# Patient Record
Sex: FEMALE | URBAN - METROPOLITAN AREA
[De-identification: names, ages, dates, MRNs, and addresses within clinical notes are randomized per-mention and may not be internally consistent; named-entity substitution may affect disease eponyms.]

---

## 2024-07-03 ENCOUNTER — APPOINTMENT (OUTPATIENT)
Dept: URBAN - METROPOLITAN AREA CLINIC 254 | Age: 29
Setting detail: DERMATOLOGY
End: 2024-07-05

## 2024-07-03 VITALS — HEIGHT: 67 IN | WEIGHT: 156 LBS

## 2024-07-03 DIAGNOSIS — L91.0 HYPERTROPHIC SCAR: ICD-10-CM

## 2024-07-03 PROCEDURE — OTHER INTRALESIONAL KENALOG: OTHER

## 2024-07-03 PROCEDURE — OTHER ADDITIONAL NOTES: OTHER

## 2024-07-03 PROCEDURE — OTHER MIPS QUALITY: OTHER

## 2024-07-03 PROCEDURE — 11900 INJECT SKIN LESIONS </W 7: CPT

## 2024-07-03 PROCEDURE — OTHER COUNSELING: OTHER

## 2024-07-03 ASSESSMENT — LOCATION SIMPLE DESCRIPTION DERM: LOCATION SIMPLE: RIGHT EAR

## 2024-07-03 ASSESSMENT — LOCATION ZONE DERM: LOCATION ZONE: EAR

## 2024-07-03 ASSESSMENT — LOCATION DETAILED DESCRIPTION DERM: LOCATION DETAILED: RIGHT SUPERIOR POSTERIOR HELIX

## 2024-07-03 NOTE — HPI: SKIN LESION
Is This A New Presentation, Or A Follow-Up?: Skin Lesion
Additional History: Pt reports concern of lesion on her right ear. She states she had gotten a piercing in 2017, which started out as a small bump, but once pregnant in 2021 she reports it had increased in size after giving birth. Pt says it is painful in that area if getting her menstrual cycle and seems to be getting bigger. She notes she had seen an ENT who diagnosed it as a keloid, and administered a serious of steroid injections every 3 months. ENT advised they were using steroid injections to significantly shrink the lesion in order to surgically remove. Pt reports she had only done 4 injections total before ENT decided it may not be worth surgically removing yet because there has only been minor improvement. ENT also advised patient to receive the injections more frequently to see if that would improve the size. Pt notes she wanted to get a second opinion at a dermatologist, and presents for further evaluation.

## 2024-07-03 NOTE — PROCEDURE: ADDITIONAL NOTES
Additional Notes: **Numbing injection (lidocaine w/ epi) used prior to ILK per patient request, as states they had done this at ENT previously. Approximately 0.4 cc used. Ice pack also applied. \\n\\nMeasured size at time of OV is 2cm x 1.5cm. Discussed considering excision or excision w/ SRT as potential option if no improvement. Encouraged more frequent injections, every 4 weeks
Render Risk Assessment In Note?: no
Detail Level: Simple

## 2024-07-03 NOTE — PROCEDURE: INTRALESIONAL KENALOG
Show Inventory Tab: Hide
Concentration Of Kenalog Solution Injected (Mg/Ml): 10.0
X Size Of Lesion In Cm (Optional): 0
Ndc# For Kenalog Only: [id number]
Bill For Wasted Drug (Kenalog)?: no
Kenalog Preparation: Kenalog
Validate Note Data When Using Inventory: Yes
Medical Necessity Clause: This procedure was medically necessary because the lesions that were treated were:
Total Volume (Ccs): 0.5
Detail Level: Detailed
Administered By (Optional): Oliva Boateng PA-C
Lot # For Kenalog (Optional): [id number]
Consent: The risks of atrophy were reviewed with the patient.
Kenalog Type Of Vial: Multiple Dose
Expiration Date For Kenalog (Optional): 01/2026

## 2024-07-31 ENCOUNTER — APPOINTMENT (OUTPATIENT)
Dept: URBAN - METROPOLITAN AREA CLINIC 254 | Age: 29
Setting detail: DERMATOLOGY
End: 2024-08-01

## 2024-07-31 VITALS — HEIGHT: 67 IN | WEIGHT: 157 LBS

## 2024-07-31 DIAGNOSIS — L91.0 HYPERTROPHIC SCAR: ICD-10-CM

## 2024-07-31 PROCEDURE — OTHER COUNSELING: OTHER

## 2024-07-31 PROCEDURE — OTHER ADDITIONAL NOTES: OTHER

## 2024-07-31 PROCEDURE — OTHER INTRALESIONAL KENALOG: OTHER

## 2024-07-31 PROCEDURE — 11900 INJECT SKIN LESIONS </W 7: CPT

## 2024-07-31 PROCEDURE — OTHER MIPS QUALITY: OTHER

## 2024-07-31 ASSESSMENT — LOCATION ZONE DERM: LOCATION ZONE: EAR

## 2024-07-31 ASSESSMENT — LOCATION DETAILED DESCRIPTION DERM: LOCATION DETAILED: RIGHT SUPERIOR POSTERIOR HELIX

## 2024-07-31 ASSESSMENT — LOCATION SIMPLE DESCRIPTION DERM: LOCATION SIMPLE: RIGHT EAR

## 2024-07-31 NOTE — PROCEDURE: ADDITIONAL NOTES
Additional Notes: **Numbing injection (lidocaine w/ epi) used prior to ILK per patient request, as states they had done this at ENT previously. Approximately 0.2 cc used. Ice pack also applied. \\n\\nMeasured size at time of OV is 2cm x 1.5cm. Discussed considering excision or excision w/ SRT as potential option if no improvement. 
Render Risk Assessment In Note?: no
Detail Level: Simple

## 2024-07-31 NOTE — PROCEDURE: INTRALESIONAL KENALOG
Show Inventory Tab: Hide
Concentration Of Kenalog Solution Injected (Mg/Ml): 10.0
X Size Of Lesion In Cm (Optional): 0
Ndc# For Kenalog Only: [id number]
Bill For Wasted Drug (Kenalog)?: no
Kenalog Preparation: Kenalog
Validate Note Data When Using Inventory: Yes
Medical Necessity Clause: This procedure was medically necessary because the lesions that were treated were:
Total Volume (Ccs): 0.2
Detail Level: Detailed
Administered By (Optional): Oliva Boateng PA-C
Lot # For Kenalog (Optional): [id number]
Consent: The risks of atrophy were reviewed with the patient.
Kenalog Type Of Vial: Multiple Dose
Expiration Date For Kenalog (Optional): 01/2026

## 2024-09-11 ENCOUNTER — APPOINTMENT (OUTPATIENT)
Dept: URBAN - METROPOLITAN AREA CLINIC 254 | Age: 29
Setting detail: DERMATOLOGY
End: 2024-09-16

## 2024-09-11 VITALS — WEIGHT: 157 LBS | HEIGHT: 67 IN

## 2024-09-11 DIAGNOSIS — L91.0 HYPERTROPHIC SCAR: ICD-10-CM

## 2024-09-11 PROCEDURE — OTHER MIPS QUALITY: OTHER

## 2024-09-11 PROCEDURE — OTHER INTRALESIONAL KENALOG: OTHER

## 2024-09-11 PROCEDURE — 11900 INJECT SKIN LESIONS </W 7: CPT

## 2024-09-11 PROCEDURE — OTHER ADDITIONAL NOTES: OTHER

## 2024-09-11 PROCEDURE — OTHER CONSULTATION FOR EXCISION OF BENIGN LESIONS: OTHER

## 2024-09-11 PROCEDURE — OTHER CONSULTATION FOR EXCISION: OTHER

## 2024-09-11 PROCEDURE — 99213 OFFICE O/P EST LOW 20 MIN: CPT | Mod: 25

## 2024-09-11 PROCEDURE — OTHER COUNSELING: OTHER

## 2024-09-11 ASSESSMENT — LOCATION DETAILED DESCRIPTION DERM: LOCATION DETAILED: RIGHT SUPERIOR POSTERIOR HELIX

## 2024-09-11 ASSESSMENT — LOCATION SIMPLE DESCRIPTION DERM: LOCATION SIMPLE: RIGHT EAR

## 2024-09-11 ASSESSMENT — LOCATION ZONE DERM: LOCATION ZONE: EAR

## 2024-09-11 NOTE — PROCEDURE: ADDITIONAL NOTES
Detail Level: Simple
Render Risk Assessment In Note?: no
Additional Notes: Discussed excision, and possible SRT in detail w/ patient. Reviewed potentital for incomplete removal, scarring, and recurrence. Will refer patient to Dr. Amaral for further management.
Additional Notes: 0.3 cc of lidocaine w/ epi was used prior to ILK.

## 2024-09-11 NOTE — PROCEDURE: INTRALESIONAL KENALOG
Kenalog Preparation: Kenalog
How Many Mls Were Removed From The 40 Mg/Ml (5ml) Vial When Preparing The Injectable Solution?: 0
Total Volume (Ccs): 0.2
Treatment Number (Optional): 1
Consent: The risks of atrophy were reviewed with the patient.
Detail Level: Detailed
Administered By (Optional): Oliva Boateng PA-C
Kenalog Type Of Vial: Multiple Dose
Require Ndc Code?: No
Expiration Date For Kenalog (Optional): feb 2026
Concentration Of Kenalog Solution Injected (Mg/Ml): 10.0
Show Inventory Tab: Hide
Ndc# For Kenalog Only: [id number]
Validate Note Data When Using Inventory: Yes
Medical Necessity Clause: This procedure was medically necessary because the lesions that were treated were: